# Patient Record
(demographics unavailable — no encounter records)

---

## 2025-03-19 NOTE — HISTORY OF PRESENT ILLNESS
[de-identified] : Julián Medel is a 58 year old male presented to the office For evaluation of his left hip pain.  Patient has been experiencing left hip pain since 3/13/2025.  Patient was working and walking a lot when he felt pain.  He is unable to walk the next day.  Pain is located over the lateral hip.  He has tenderness over the lateral hip.  He is unable to sleep on that side.  Patient has tried Tylenol.  Has not tried physical therapy or injections.  History: CKD, Diabetes (Last A1C: 6.8)

## 2025-03-19 NOTE — DISCUSSION/SUMMARY
[de-identified] : Julián Medel is a 58-year-old male who presents to the office for evaluation of his left hip pain.  X-rays showed no obvious fractures or dislocations.  There was calcific tendinitis over the left hip.  Examination showed tenderness over the left hip. Discussed with the patient the examination and imaging findings.  Discussed with the patient the management of patient's hip bursitis/calcific tendinitis at this time, including physical therapy, anti-inflammatories, and injections.  Patient was given referral for physical therapy.  Patient will take Tylenol as needed for pain control.  Patient will follow-up in 2 months for reevaluation and management.  Patient understanding and in agreement the plan.  All questions answered.  Plan: -Physical Therapy -Tylenol as needed for pain control -Follow up in 2 months for reevaluation and management

## 2025-03-19 NOTE — PHYSICAL EXAM
[de-identified] : Constitutional:  58 year old male, alert and oriented, cooperative, in no acute distress.  HEENT  NC/AT.  Appearance: symmetric  Neck/Back Straight without deformity or instability.  Good ROM.  Chest/Respiratory  Respiratory effort: no intercostal retractions or use of accessory muscles. Nonlabored Breathing  Mental Status:  Judgment, insight: intact Orientation: oriented to time, place, and person  Neurological: Sensory and Motor are grossly intact throughout  Left Hip Exam:  Tenderness:  	Sacroiliac: Negative  	Greater trochanter: Positive, Replicates pain                  LINO Test: Negative                  FADIR Test: Negative   Range of Motion:                 Extension - 0  	Flexion - 100 	IR - 20  	ER - 30 	Abd - 45  	Add - 30   Neurologic Exam     Motor intact including 5/5 Extensor Hallucis Longus, 5/5 Flexor Hallucis Longus, 5/5 Tibialis Anterior and 5/5 Gastrocnemius     Sensation Intact to Light Touch including Saphenous, Sural, Superficial Peroneal, Deep Peroneal, Tibial nerve distributions  Vascular Exam     Foot is warm and well perfused with 2+ Dorsalis Pedis Pulse  No pain with range of motion of the right hip or bilateral knees. No lumbar paraspinal muscle tenderness. [de-identified] : XRay: XRays of the Pelvis (1 View) and Left Hip (2 Views) taken in the office today and discussed with the patient. XRays demonstrate no obvious fractures or dislocations. There is no significant evidence of osteoarthritis, Tonnis Grade: 0. (my personal interpretation)     ACC: [id number] EXAM: XR HIP WITH PELV 2-3V LT ORDERED BY: DARRYL BLANCO  PROCEDURE DATE: 03/17/2025    INTERPRETATION: Left hip pain.  AP pelvis, 2 views left hip.  IMPRESSION: No acute displaced fracture or dislocation. Hip joints preserved. Marginal spurring acetabula bilaterally. Calcific bursitis/tendinitis left hip. Sacroiliac joints pubic symphysis intact.  --- End of Report ---      FAWN ERICKSON MD; Attending Radiologist This document has been electronically signed. Mar 17 2025 1:17PM

## 2025-03-19 NOTE — HISTORY OF PRESENT ILLNESS
[de-identified] : Julián Medel is a 58 year old male presented to the office For evaluation of his left hip pain.  Patient has been experiencing left hip pain since 3/13/2025.  Patient was working and walking a lot when he felt pain.  He is unable to walk the next day.  Pain is located over the lateral hip.  He has tenderness over the lateral hip.  He is unable to sleep on that side.  Patient has tried Tylenol.  Has not tried physical therapy or injections.  History: CKD, Diabetes (Last A1C: 6.8)

## 2025-03-19 NOTE — DISCUSSION/SUMMARY
[de-identified] : Julián Medel is a 58-year-old male who presents to the office for evaluation of his left hip pain.  X-rays showed no obvious fractures or dislocations.  There was calcific tendinitis over the left hip.  Examination showed tenderness over the left hip. Discussed with the patient the examination and imaging findings.  Discussed with the patient the management of patient's hip bursitis/calcific tendinitis at this time, including physical therapy, anti-inflammatories, and injections.  Patient was given referral for physical therapy.  Patient will take Tylenol as needed for pain control.  Patient will follow-up in 2 months for reevaluation and management.  Patient understanding and in agreement the plan.  All questions answered.  Plan: -Physical Therapy -Tylenol as needed for pain control -Follow up in 2 months for reevaluation and management

## 2025-03-19 NOTE — PHYSICAL EXAM
[de-identified] : Constitutional:  58 year old male, alert and oriented, cooperative, in no acute distress.  HEENT  NC/AT.  Appearance: symmetric  Neck/Back Straight without deformity or instability.  Good ROM.  Chest/Respiratory  Respiratory effort: no intercostal retractions or use of accessory muscles. Nonlabored Breathing  Mental Status:  Judgment, insight: intact Orientation: oriented to time, place, and person  Neurological: Sensory and Motor are grossly intact throughout  Left Hip Exam:  Tenderness:  	Sacroiliac: Negative  	Greater trochanter: Positive, Replicates pain                  LINO Test: Negative                  FADIR Test: Negative   Range of Motion:                 Extension - 0  	Flexion - 100 	IR - 20  	ER - 30 	Abd - 45  	Add - 30   Neurologic Exam     Motor intact including 5/5 Extensor Hallucis Longus, 5/5 Flexor Hallucis Longus, 5/5 Tibialis Anterior and 5/5 Gastrocnemius     Sensation Intact to Light Touch including Saphenous, Sural, Superficial Peroneal, Deep Peroneal, Tibial nerve distributions  Vascular Exam     Foot is warm and well perfused with 2+ Dorsalis Pedis Pulse  No pain with range of motion of the right hip or bilateral knees. No lumbar paraspinal muscle tenderness. [de-identified] : XRay: XRays of the Pelvis (1 View) and Left Hip (2 Views) taken in the office today and discussed with the patient. XRays demonstrate no obvious fractures or dislocations. There is no significant evidence of osteoarthritis, Tonnis Grade: 0. (my personal interpretation)     ACC: [id number] EXAM: XR HIP WITH PELV 2-3V LT ORDERED BY: DARRYL BLANCO  PROCEDURE DATE: 03/17/2025    INTERPRETATION: Left hip pain.  AP pelvis, 2 views left hip.  IMPRESSION: No acute displaced fracture or dislocation. Hip joints preserved. Marginal spurring acetabula bilaterally. Calcific bursitis/tendinitis left hip. Sacroiliac joints pubic symphysis intact.  --- End of Report ---      FAWN ERICKSON MD; Attending Radiologist This document has been electronically signed. Mar 17 2025 1:17PM  2017

## 2025-05-06 NOTE — HISTORY OF PRESENT ILLNESS
[de-identified] : 5/6/2025  Julián Medel presents to the office for follow-up of his left hip pain.  Patient is currently doing well overall.  His pain has mostly improved.  He is currently in physical therapy.  Therapy helped his hip.  3/19/2025 Julián Medel is a 58 year old male presented to the office For evaluation of his left hip pain.  Patient has been experiencing left hip pain since 3/13/2025.  Patient was working and walking a lot when he felt pain.  He is unable to walk the next day.  Pain is located over the lateral hip.  He has tenderness over the lateral hip.  He is unable to sleep on that side.  Patient has tried Tylenol.  Has not tried physical therapy or injections.  History: CKD, Diabetes (Last A1C: 6.8)

## 2025-05-06 NOTE — PHYSICAL EXAM
[de-identified] : Constitutional:  58 year old male, alert and oriented, cooperative, in no acute distress.  HEENT  NC/AT.  Appearance: symmetric  Neck/Back Straight without deformity or instability.  Good ROM.  Chest/Respiratory  Respiratory effort: no intercostal retractions or use of accessory muscles. Nonlabored Breathing  Mental Status:  Judgment, insight: intact Orientation: oriented to time, place, and person  Neurological: Sensory and Motor are grossly intact throughout  Left Hip Exam:  Tenderness:  	Sacroiliac: Negative  	Greater trochanter: Negative                  LINO Test: Negative                  FADIR Test: Negative   Range of Motion:                 Extension - 0  	Flexion - 100 	IR - 20  	ER - 30 	Abd - 45  	Add - 30   Neurologic Exam     Motor intact including 5/5 Extensor Hallucis Longus, 5/5 Flexor Hallucis Longus, 5/5 Tibialis Anterior and 5/5 Gastrocnemius     Sensation Intact to Light Touch including Saphenous, Sural, Superficial Peroneal, Deep Peroneal, Tibial nerve distributions  Vascular Exam     Foot is warm and well perfused with 2+ Dorsalis Pedis Pulse  No pain with range of motion of the right hip or bilateral knees. No lumbar paraspinal muscle tenderness. [de-identified] : XRay: XRays of the Pelvis (1 View) and Left Hip (2 Views) taken on 3/19/2025. XRays demonstrate no obvious fractures or dislocations. There is no significant evidence of osteoarthritis, Tonnis Grade: 0. (my personal interpretation)     ACC: [id number] EXAM: XR HIP WITH PELV 2-3V LT ORDERED BY: DARRYL BLANCO  PROCEDURE DATE: 03/17/2025    INTERPRETATION: Left hip pain.  AP pelvis, 2 views left hip.  IMPRESSION: No acute displaced fracture or dislocation. Hip joints preserved. Marginal spurring acetabula bilaterally. Calcific bursitis/tendinitis left hip. Sacroiliac joints pubic symphysis intact.  --- End of Report ---      FAWN ERICKSON MD; Attending Radiologist This document has been electronically signed. Mar 17 2025 1:17PM

## 2025-05-06 NOTE — DISCUSSION/SUMMARY
[de-identified] : Julián Medel is a 58-year-old male who presents to the office for evaluation of his left hip pain.  X-rays showed no obvious fractures or dislocations.  There was calcific tendinitis over the left hip.  Examination showed tenderness over the left hip. Discussed with patient that he is currently doing well.  Patient will continue his home exercises.  He will take over-the-counter anti-inflammatories as needed for pain control.  Patient will follow-up as needed for reevaluation management.  Patient understanding and in agreement with the plan.  All questions answered.  Plan: - Home exercises - Follow-up as needed for reevaluation and management